# Patient Record
Sex: FEMALE | Race: WHITE | NOT HISPANIC OR LATINO | URBAN - METROPOLITAN AREA
[De-identification: names, ages, dates, MRNs, and addresses within clinical notes are randomized per-mention and may not be internally consistent; named-entity substitution may affect disease eponyms.]

---

## 2017-07-26 ENCOUNTER — APPOINTMENT (OUTPATIENT)
Age: 32
Setting detail: DERMATOLOGY
End: 2017-07-26

## 2017-07-26 DIAGNOSIS — D18.0 HEMANGIOMA: ICD-10-CM

## 2017-07-26 DIAGNOSIS — L81.4 OTHER MELANIN HYPERPIGMENTATION: ICD-10-CM

## 2017-07-26 DIAGNOSIS — D485 NEOPLASM OF UNCERTAIN BEHAVIOR OF SKIN: ICD-10-CM

## 2017-07-26 DIAGNOSIS — D22 MELANOCYTIC NEVI: ICD-10-CM

## 2017-07-26 PROBLEM — D22.4 MELANOCYTIC NEVI OF SCALP AND NECK: Status: ACTIVE | Noted: 2017-07-26

## 2017-07-26 PROBLEM — D48.5 NEOPLASM OF UNCERTAIN BEHAVIOR OF SKIN: Status: ACTIVE | Noted: 2017-07-26

## 2017-07-26 PROBLEM — D18.01 HEMANGIOMA OF SKIN AND SUBCUTANEOUS TISSUE: Status: ACTIVE | Noted: 2017-07-26

## 2017-07-26 PROBLEM — D22.72 MELANOCYTIC NEVI OF LEFT LOWER LIMB, INCLUDING HIP: Status: ACTIVE | Noted: 2017-07-26

## 2017-07-26 PROBLEM — D22.71 MELANOCYTIC NEVI OF RIGHT LOWER LIMB, INCLUDING HIP: Status: ACTIVE | Noted: 2017-07-26

## 2017-07-26 PROBLEM — D22.5 MELANOCYTIC NEVI OF TRUNK: Status: ACTIVE | Noted: 2017-07-26

## 2017-07-26 PROBLEM — D22.39 MELANOCYTIC NEVI OF OTHER PARTS OF FACE: Status: ACTIVE | Noted: 2017-07-26

## 2017-07-26 PROCEDURE — 11101: CPT

## 2017-07-26 PROCEDURE — OTHER COUNSELING: OTHER

## 2017-07-26 PROCEDURE — OTHER BIOPSY BY SHAVE METHOD: OTHER

## 2017-07-26 PROCEDURE — 11100: CPT

## 2017-07-26 PROCEDURE — 99214 OFFICE O/P EST MOD 30 MIN: CPT | Mod: 25

## 2017-07-26 ASSESSMENT — LOCATION DETAILED DESCRIPTION DERM
LOCATION DETAILED: LEFT PROXIMAL PRETIBIAL REGION
LOCATION DETAILED: LEFT INFERIOR CENTRAL MALAR CHEEK
LOCATION DETAILED: LEFT SUPERIOR LATERAL MIDBACK
LOCATION DETAILED: RIGHT SUPERIOR MEDIAL UPPER BACK
LOCATION DETAILED: RIGHT MEDIAL UPPER BACK
LOCATION DETAILED: LEFT ANTERIOR PROXIMAL THIGH
LOCATION DETAILED: INFERIOR THORACIC SPINE
LOCATION DETAILED: RIGHT SUPERIOR ANTERIOR NECK
LOCATION DETAILED: SUPERIOR THORACIC SPINE
LOCATION DETAILED: RIGHT ANTERIOR PROXIMAL THIGH
LOCATION DETAILED: LEFT DORSAL FOOT
LOCATION DETAILED: LEFT SUPERIOR MEDIAL MIDBACK
LOCATION DETAILED: RIGHT PROXIMAL PRETIBIAL REGION
LOCATION DETAILED: RIGHT PROXIMAL DORSAL FOREARM
LOCATION DETAILED: LEFT PROXIMAL DORSAL FOREARM
LOCATION DETAILED: LEFT LATERAL ABDOMEN

## 2017-07-26 ASSESSMENT — LOCATION SIMPLE DESCRIPTION DERM
LOCATION SIMPLE: RIGHT UPPER BACK
LOCATION SIMPLE: RIGHT THIGH
LOCATION SIMPLE: LEFT PRETIBIAL REGION
LOCATION SIMPLE: LEFT FOREARM
LOCATION SIMPLE: LEFT THIGH
LOCATION SIMPLE: LEFT LOWER BACK
LOCATION SIMPLE: RIGHT ANTERIOR NECK
LOCATION SIMPLE: ABDOMEN
LOCATION SIMPLE: RIGHT FOREARM
LOCATION SIMPLE: LEFT CHEEK
LOCATION SIMPLE: LEFT FOOT
LOCATION SIMPLE: UPPER BACK
LOCATION SIMPLE: RIGHT PRETIBIAL REGION

## 2017-07-26 ASSESSMENT — LOCATION ZONE DERM
LOCATION ZONE: FEET
LOCATION ZONE: ARM
LOCATION ZONE: FACE
LOCATION ZONE: NECK
LOCATION ZONE: TRUNK
LOCATION ZONE: LEG

## 2017-07-26 NOTE — PROCEDURE: BIOPSY BY SHAVE METHOD
Size Of Lesion In Cm: 0.3
Destruction After The Procedure: No
Dressing: bandage
Biopsy Method: Dermablade
Type Of Destruction Used: Curettage
Detail Level: Detailed
Curettage Text: The wound bed was treated with curettage after the biopsy was performed.
Anesthesia Volume In Cc (Will Not Render If 0): 0.5
Biopsy Type: H and E
Billing Type: Third-Party Bill
Electrodesiccation Text: The wound bed was treated with electrodesiccation after the biopsy was performed.
Silver Nitrate Text: The wound bed was treated with silver nitrate after the biopsy was performed.
Notification Instructions: Patient will be notified of biopsy results. However, patient instructed to call the office if not contacted within 2 weeks.
Additional Anesthesia Volume In Cc (Will Not Render If 0): 0
Render Post-Care Instructions In Note?: yes
Wound Care: Vaseline
Consent: Written consent was obtained and risks were reviewed including but not limited to scarring, infection, bleeding, scabbing, incomplete removal, nerve damage and allergy to anesthesia.
Cryotherapy Text: The wound bed was treated with cryotherapy after the biopsy was performed.
Post-Care Instructions: I reviewed with the patient in detail post-care instructions. Patient is to keep the biopsy site dry overnight, and then apply bacitracin twice daily until healed. Patient may apply hydrogen peroxide soaks to remove any crusting.
Hemostasis: Drysol
Anesthesia Type: 2% lidocaine with epinephrine
Electrodesiccation And Curettage Text: The wound bed was treated with electrodesiccation and curettage after the biopsy was performed.

## 2017-08-29 ENCOUNTER — APPOINTMENT (OUTPATIENT)
Age: 32
Setting detail: DERMATOLOGY
End: 2017-08-29

## 2017-08-29 DIAGNOSIS — D22 MELANOCYTIC NEVI: ICD-10-CM

## 2017-08-29 PROBLEM — D22.5 MELANOCYTIC NEVI OF TRUNK: Status: ACTIVE | Noted: 2017-08-29

## 2017-08-29 PROCEDURE — 11400 EXC TR-EXT B9+MARG 0.5 CM<: CPT

## 2017-08-29 PROCEDURE — 11400 EXC TR-EXT B9+MARG 0.5 CM<: CPT | Mod: 76

## 2017-08-29 PROCEDURE — OTHER PUNCH EXCISION: OTHER

## 2017-08-29 ASSESSMENT — LOCATION SIMPLE DESCRIPTION DERM
LOCATION SIMPLE: UPPER BACK
LOCATION SIMPLE: LEFT LOWER BACK

## 2017-08-29 ASSESSMENT — LOCATION ZONE DERM: LOCATION ZONE: TRUNK

## 2017-08-29 ASSESSMENT — LOCATION DETAILED DESCRIPTION DERM
LOCATION DETAILED: LEFT INFERIOR MEDIAL MIDBACK
LOCATION DETAILED: INFERIOR THORACIC SPINE

## 2017-08-29 NOTE — PROCEDURE: PUNCH EXCISION
Path Notes (To The Dermatopathologist): Please check margins. Previous Accession [id number] Path Notes (To The Dermatopathologist): Please check margins. Previous Accession [id number]

## 2018-02-14 ENCOUNTER — APPOINTMENT (OUTPATIENT)
Age: 33
Setting detail: DERMATOLOGY
End: 2018-02-15

## 2018-02-14 DIAGNOSIS — Z87.2 PERSONAL HISTORY OF DISEASES OF THE SKIN AND SUBCUTANEOUS TISSUE: ICD-10-CM

## 2018-02-14 DIAGNOSIS — L57.8 OTHER SKIN CHANGES DUE TO CHRONIC EXPOSURE TO NONIONIZING RADIATION: ICD-10-CM

## 2018-02-14 DIAGNOSIS — Z71.89 OTHER SPECIFIED COUNSELING: ICD-10-CM

## 2018-02-14 DIAGNOSIS — D22 MELANOCYTIC NEVI: ICD-10-CM

## 2018-02-14 DIAGNOSIS — L81.4 OTHER MELANIN HYPERPIGMENTATION: ICD-10-CM

## 2018-02-14 DIAGNOSIS — L91.0 HYPERTROPHIC SCAR: ICD-10-CM

## 2018-02-14 PROBLEM — D22.5 MELANOCYTIC NEVI OF TRUNK: Status: ACTIVE | Noted: 2018-02-14

## 2018-02-14 PROBLEM — D22.62 MELANOCYTIC NEVI OF LEFT UPPER LIMB, INCLUDING SHOULDER: Status: ACTIVE | Noted: 2018-02-14

## 2018-02-14 PROBLEM — D22.61 MELANOCYTIC NEVI OF RIGHT UPPER LIMB, INCLUDING SHOULDER: Status: ACTIVE | Noted: 2018-02-14

## 2018-02-14 PROCEDURE — 11900 INJECT SKIN LESIONS </W 7: CPT

## 2018-02-14 PROCEDURE — OTHER INTRALESIONAL KENALOG: OTHER

## 2018-02-14 PROCEDURE — 99214 OFFICE O/P EST MOD 30 MIN: CPT | Mod: 25

## 2018-02-14 PROCEDURE — OTHER COUNSELING: OTHER

## 2018-02-14 PROCEDURE — OTHER MIPS QUALITY: OTHER

## 2018-02-14 ASSESSMENT — LOCATION DETAILED DESCRIPTION DERM
LOCATION DETAILED: INFERIOR LUMBAR SPINE
LOCATION DETAILED: LEFT ANTERIOR SHOULDER
LOCATION DETAILED: LEFT SUPERIOR MEDIAL UPPER BACK
LOCATION DETAILED: RIGHT ANTERIOR SHOULDER
LOCATION DETAILED: RIGHT PROXIMAL DORSAL FOREARM
LOCATION DETAILED: RIGHT INFERIOR MEDIAL UPPER BACK
LOCATION DETAILED: LEFT DISTAL DORSAL FOREARM
LOCATION DETAILED: EPIGASTRIC SKIN
LOCATION DETAILED: LEFT SUPERIOR MEDIAL LOWER BACK
LOCATION DETAILED: RIGHT INFERIOR MEDIAL FOREHEAD

## 2018-02-14 ASSESSMENT — LOCATION SIMPLE DESCRIPTION DERM
LOCATION SIMPLE: LEFT FOREARM
LOCATION SIMPLE: LEFT SHOULDER
LOCATION SIMPLE: RIGHT FOREARM
LOCATION SIMPLE: RIGHT FOREHEAD
LOCATION SIMPLE: RIGHT UPPER BACK
LOCATION SIMPLE: LEFT LOWER BACK
LOCATION SIMPLE: LEFT UPPER BACK
LOCATION SIMPLE: RIGHT SHOULDER
LOCATION SIMPLE: ABDOMEN
LOCATION SIMPLE: LOWER BACK

## 2018-02-14 ASSESSMENT — LOCATION ZONE DERM
LOCATION ZONE: FACE
LOCATION ZONE: TRUNK
LOCATION ZONE: ARM

## 2018-02-14 NOTE — PROCEDURE: INTRALESIONAL KENALOG
Kenalog Preparation: Kenalog
Total Volume Injected (Ccs- Only Use Numbers And Decimals): 0.2
Consent: The risks of atrophy were reviewed with the patient.
Treatment Number (Optional): 1
X Size Of Lesion In Cm (Optional): 0
Detail Level: Simple
Concentration Of Solution Injected (Mg/Ml): 10.0
Include Z78.9 (Other Specified Conditions Influencing Health Status) As An Associated Diagnosis?: Yes
Administered By (Optional): Ankita Shanks, NP-C
Medical Necessity Clause: This procedure was medically necessary because the lesions that were treated were:

## 2019-11-12 ENCOUNTER — ANCILLARY PROCEDURE (OUTPATIENT)
Dept: RADIOLOGY | Facility: URGENT CARE | Age: 34
End: 2019-11-12
Payer: OTHER GOVERNMENT

## 2019-11-12 ENCOUNTER — OFFICE VISIT (OUTPATIENT)
Dept: URGENT CARE | Facility: URGENT CARE | Age: 34
End: 2019-11-12
Payer: OTHER GOVERNMENT

## 2019-11-12 VITALS
HEART RATE: 81 BPM | SYSTOLIC BLOOD PRESSURE: 109 MMHG | HEIGHT: 65 IN | WEIGHT: 120 LBS | OXYGEN SATURATION: 98 % | TEMPERATURE: 97.5 F | BODY MASS INDEX: 19.99 KG/M2 | RESPIRATION RATE: 18 BRPM | DIASTOLIC BLOOD PRESSURE: 58 MMHG

## 2019-11-12 DIAGNOSIS — M79.675 GREAT TOE PAIN, LEFT: Primary | ICD-10-CM

## 2019-11-12 DIAGNOSIS — S92.425A CLOSED NONDISPLACED FRACTURE OF DISTAL PHALANX OF LEFT GREAT TOE, INITIAL ENCOUNTER: ICD-10-CM

## 2019-11-12 PROCEDURE — 99202 OFFICE O/P NEW SF 15 MIN: CPT | Performed by: PHYSICIAN ASSISTANT

## 2019-11-12 PROCEDURE — 73660 X-RAY EXAM OF TOE(S): CPT | Mod: TC,LT | Performed by: PHYSICIAN ASSISTANT

## 2019-11-12 ASSESSMENT — PAIN SCALES - GENERAL: PAINLEVEL: 2

## 2019-11-12 NOTE — PROGRESS NOTES
"Subjective      HPI    Dodie Stark is a 34 y.o. female who presents for toe pain.  Patient states last night she was walking up steps from her basement and rammed her toe into the step.  She did have feel a pop initially but did not have a great amount of pain throughout the day today the pain is worse since she has noticed increased swelling and bruising.  She has used ice and Motrin with minimal relief.    Review of Systems    As noted in HPI.      Objective   /58 (BP Location: Right arm, Patient Position: Sitting, Cuff Size: Reg)   Pulse 81   Temp 36.4 °C (97.5 °F) (Temporal)   Resp 18   Ht 1.651 m (5' 5\")   Wt 54.4 kg (120 lb)   SpO2 98%   BMI 19.97 kg/m²     Physical Exam  Vitals signs and nursing note reviewed.   Constitutional:       Appearance: Normal appearance. She is normal weight.   HENT:      Head: Normocephalic and atraumatic.   Cardiovascular:      Pulses: Normal pulses.   Pulmonary:      Effort: Pulmonary effort is normal.   Musculoskeletal:      Comments: Patient has ecchymosis and tenderness to palpation over the medial aspect of the DIP joint on the left great toe.  She does have full range of motion of the digit although she does have tenderness with hyperextension.  Neurovascular is intact.   Neurological:      Mental Status: She is alert.         No results found for this or any previous visit (from the past 4 hour(s)).    X-ray Toe 2 Or More Views Left    Result Date: 11/12/2019  Exam: Left toe series on 11/12/2019 at 1637 hours Clinical History: Great toe pain left; tenderness lateral aspect of the first DIP. Procedure/Views: 3 views Comparison/s: There are no previous comparable exams available. Findings: Bones demonstrate normal morphology and density. Nondisplaced fractures base of the distal phalanx of the great toe. The anterior phalangeal joint demonstrates anatomic alignment. Proximal phalanx, first MTP joint and first metatarsal appear intact.     IMPRESSION: Nondisplaced " fracture of the base of the distal phalanx of the great toe.        Assessment/Plan   Diagnoses and all orders for this visit:    Great toe pain, left  -     X-ray toe 2 or more views left    Closed nondisplaced fracture of distal phalanx of left great toe, initial encounter        Discussion:   Discussed Fracture with Patient.  Did Recommend a Minimum Eder Taping but Did Also Recommend Postop Shoe or Short Walking Boot Patient Did Refuse Those.  We Discussed Hard Soled Shoes Not Allowing the Toe to Flex No Flip-Flops No High Heels Etc.  Patient Understands and Agrees to Plan of Care.       ESTEFANIA Sabillon

## 2020-03-13 ENCOUNTER — APPOINTMENT (OUTPATIENT)
Age: 35
Setting detail: DERMATOLOGY
End: 2020-03-17

## 2020-03-13 DIAGNOSIS — Z41.9 ENCOUNTER FOR PROCEDURE FOR PURPOSES OTHER THAN REMEDYING HEALTH STATE, UNSPECIFIED: ICD-10-CM

## 2020-03-13 PROCEDURE — OTHER PULSED-DYE LASER: OTHER

## 2020-03-13 PROCEDURE — OTHER OTHER (COSMETIC): OTHER

## 2020-03-13 ASSESSMENT — LOCATION SIMPLE DESCRIPTION DERM: LOCATION SIMPLE: LEFT CHEEK

## 2020-03-13 ASSESSMENT — LOCATION ZONE DERM: LOCATION ZONE: FACE

## 2020-03-13 ASSESSMENT — LOCATION DETAILED DESCRIPTION DERM: LOCATION DETAILED: LEFT INFERIOR CENTRAL MALAR CHEEK

## 2020-03-13 NOTE — PROCEDURE: OTHER (COSMETIC)
Detail Level: Zone
Other (Free Text): Patient tolerated treatment. \\nWe reviewed post care, staying out of direct sunlight, sunscreen and wearing clothing to cover treated area when outside for extended periods. \\nApplied Spf 50 and given cold compress. \\nFollow up PRN. bbw

## 2020-03-13 NOTE — PROCEDURE: PULSED-DYE LASER
Pulse Duration: 10 ms
Spot Size: 5 mm
Delay Time (Ms): 20
Fluence In J/Cm2 (Optional): 10
Pulse Count (Optional): 4
Cryogen Time (Ms): 30
Price (Use Numbers Only, No Special Characters Or $): 100.00
Immediate Endpoint: erythema
Post-Care Instructions: I reviewed with the patient in detail post-care instructions. Patient should stay away from the sun and wear sun protection until treated areas are fully healed.
Spot Size: 7 mm pigmented lesion handpiece
Post-Procedure Care: Vaseline and ice applied. Post care reviewed with patient.
Spot Size: 12 mm
Detail Level: Zone
Laser Type: Vbeam 595nm
Pulse Duration: 20 ms
Treated Area: small area
Location Override: Cheek
Consent: Written consent obtained, risks reviewed including but not limited to crusting, scabbing, blistering, scarring, darker or lighter pigmentary change, incidental hair removal, bruising, and/or incomplete removal.
Pulse Duration: 1.5 ms
Cryogen Time (Ms): 40
Fluence In J/Cm2 (Optional): 9.75

## 2021-04-01 DIAGNOSIS — Z23 HIGH PRIORITY FOR 2019-NCOV VACCINE: ICD-10-CM

## 2021-10-29 ENCOUNTER — CLINICAL SUPPORT (OUTPATIENT)
Dept: FAMILY MEDICINE | Facility: CLINIC | Age: 36
End: 2021-10-29
Payer: OTHER GOVERNMENT

## 2021-10-29 DIAGNOSIS — Z23 ENCOUNTER FOR VACCINATION: Primary | ICD-10-CM

## 2021-10-29 PROCEDURE — 91300 PFIZER-BIONTECH SARS-COV-2 VACCINE: CPT | Performed by: FAMILY MEDICINE

## 2021-10-29 PROCEDURE — 0003A PFIZER-BIONTECH SARS-COV-2 VACCINE: CPT | Performed by: FAMILY MEDICINE

## 2021-12-28 ENCOUNTER — ANCILLARY ORDERS (OUTPATIENT)
Dept: RADIOLOGY | Facility: URGENT CARE | Age: 36
End: 2021-12-28
Payer: OTHER GOVERNMENT

## 2021-12-28 ENCOUNTER — ANCILLARY PROCEDURE (OUTPATIENT)
Dept: RADIOLOGY | Facility: URGENT CARE | Age: 36
End: 2021-12-28
Payer: OTHER GOVERNMENT

## 2021-12-28 DIAGNOSIS — M79.672 LEFT FOOT PAIN: ICD-10-CM

## 2021-12-28 PROCEDURE — 73630 X-RAY EXAM OF FOOT: CPT | Mod: TC,LT | Performed by: FAMILY MEDICINE

## 2022-01-16 ENCOUNTER — APPOINTMENT (OUTPATIENT)
Dept: LAB | Facility: URGENT CARE | Age: 37
End: 2022-01-16
Payer: OTHER GOVERNMENT

## 2022-01-16 DIAGNOSIS — Z20.822 CONTACT WITH AND (SUSPECTED) EXPOSURE TO COVID-19: ICD-10-CM

## 2022-01-16 PROCEDURE — 87635 SARS-COV-2 COVID-19 AMP PRB: CPT | Performed by: FAMILY MEDICINE

## 2022-01-16 PROCEDURE — U0005 INFEC AGEN DETEC AMPLI PROBE: HCPCS | Performed by: FAMILY MEDICINE

## 2022-01-16 PROCEDURE — 99211 OFF/OP EST MAY X REQ PHY/QHP: CPT | Mod: LAB | Performed by: FAMILY MEDICINE

## 2022-01-17 LAB — SARS-COV-2 RNA RESP QL NAA+PROBE: NEGATIVE

## 2022-05-11 ENCOUNTER — TELEPHONE (OUTPATIENT)
Dept: NEUROLOGY | Facility: CLINIC | Age: 37
End: 2022-05-11
Payer: OTHER GOVERNMENT

## 2022-05-11 NOTE — TELEPHONE ENCOUNTER
5/11/22-LVM on cell to set up emg 2nd attempt, sent letter.AM  5/3/22-LVM on cell returning patients call to set up emg with 1st.AM

## 2022-10-20 ENCOUNTER — ANCILLARY PROCEDURE (OUTPATIENT)
Dept: RADIOLOGY | Facility: URGENT CARE | Age: 37
End: 2022-10-20
Payer: OTHER GOVERNMENT

## 2022-10-20 ENCOUNTER — OFFICE VISIT (OUTPATIENT)
Dept: URGENT CARE | Facility: URGENT CARE | Age: 37
End: 2022-10-20
Payer: OTHER GOVERNMENT

## 2022-10-20 VITALS
DIASTOLIC BLOOD PRESSURE: 65 MMHG | SYSTOLIC BLOOD PRESSURE: 116 MMHG | TEMPERATURE: 97.9 F | HEART RATE: 85 BPM | WEIGHT: 120 LBS | RESPIRATION RATE: 20 BRPM | HEIGHT: 65 IN | OXYGEN SATURATION: 100 % | BODY MASS INDEX: 19.99 KG/M2

## 2022-10-20 DIAGNOSIS — R00.2 PALPITATIONS WITH REGULAR CARDIAC RHYTHM: Primary | ICD-10-CM

## 2022-10-20 PROBLEM — J30.1 ALLERGIC RHINITIS DUE TO POLLEN: Status: ACTIVE | Noted: 2020-10-09

## 2022-10-20 PROBLEM — Z51.6 DESENSITIZATION TO ALLERGENS: Status: ACTIVE | Noted: 2020-10-13

## 2022-10-20 PROBLEM — N91.5 OLIGOMENORRHEA: Status: ACTIVE | Noted: 2022-10-20

## 2022-10-20 LAB
ALBUMIN SERPL-MCNC: 4.7 G/DL (ref 3.3–5.5)
ALP SERPL-CCNC: 55 U/L (ref 42–141)
ALT SERPL-CCNC: 22 U/L (ref 10–47)
ANION GAP SERPL CALC-SCNC: 12 MMOL/L (ref 3–11)
AST SERPL-CCNC: 26 U/L
BASOPHILS # BLD AUTO: 0.03 10*3/UL
BASOPHILS NFR BLD AUTO: 0.3 % (ref 0–2)
BILIRUB SERPL-MCNC: 0.6 MG/DL (ref 0.2–1.4)
BUN SERPL-MCNC: 15 MG/DL (ref 7–25)
CALCIUM ALBUM COR SERPL-MCNC: 9.8 MG/DL (ref 8.6–10.3)
CALCIUM SERPL-MCNC: 10.4 MG/DL (ref 8.6–10.3)
CHLORIDE SERPL-SCNC: 106 MMOL/L (ref 98–107)
CO2 SERPL-SCNC: 25 MMOL/L (ref 21–32)
CREAT SERPL-MCNC: 0.8 MG/DL (ref 0.6–1.1)
EOSINOPHIL # BLD AUTO: 0.14 10*3/UL
EOSINOPHIL NFR BLD AUTO: 1.6 % (ref 0–3)
ERYTHROCYTE [DISTWIDTH] IN BLOOD BY AUTOMATED COUNT: 12.6 % (ref 11.5–14)
FERRITIN SERPL-MCNC: 60.3 NG/ML (ref 11–307)
GFR SERPL CREATININE-BSD FRML MDRD: 98 ML/MIN/1.73M*2
GLUCOSE SERPL-MCNC: 97 MG/DL (ref 70–105)
HCT VFR BLD AUTO: 45.1 % (ref 34–45)
HGB BLD-MCNC: 15.2 G/DL (ref 11.5–15.5)
IRON SATN MFR SERPL: 37 % (ref 13–50)
IRON SERPL-MCNC: 115 UG/DL (ref 50–175)
LYMPHOCYTES # BLD AUTO: 1.2 10*3/UL
LYMPHOCYTES NFR BLD AUTO: 13.4 % (ref 11–47)
MAGNESIUM SERPL-MCNC: 1.9 MG/DL (ref 1.8–2.4)
MCH RBC QN AUTO: 32.2 PG (ref 28–33)
MCHC RBC AUTO-ENTMCNC: 33.7 G/DL (ref 32–36)
MCV RBC AUTO: 95.7 FL (ref 81–97)
MONOCYTES # BLD AUTO: 0.5 10*3/UL
MONOCYTES NFR BLD AUTO: 5.6 % (ref 3–11)
NEUTROPHILS # BLD AUTO: 7.13 10*3/UL
NEUTROPHILS NFR BLD AUTO: 79.2 % (ref 41–81)
PLATELET # BLD AUTO: 232 10*3/UL (ref 140–350)
PMV BLD AUTO: 9.4 FL (ref 6.9–10.8)
POTASSIUM SERPL-SCNC: 4 MMOL/L (ref 3.5–5.1)
PROT SERPL-MCNC: 8.2 G/DL (ref 6.4–8.1)
RBC # BLD AUTO: 4.71 10*6/ΜL (ref 3.7–5.3)
SODIUM SERPL-SCNC: 143 MMOL/L (ref 128–145)
TIBC SERPL-MCNC: 312 UG/DL (ref 250–450)
UIBC SERPL-MCNC: 197 UG/DL (ref 155–355)
WBC # BLD AUTO: 9 10*3/UL (ref 4.5–10.5)

## 2022-10-20 PROCEDURE — 99214 OFFICE O/P EST MOD 30 MIN: CPT | Performed by: PHYSICIAN ASSISTANT

## 2022-10-20 PROCEDURE — 36415 COLL VENOUS BLD VENIPUNCTURE: CPT | Performed by: PHYSICIAN ASSISTANT

## 2022-10-20 PROCEDURE — 93010 ELECTROCARDIOGRAM REPORT: CPT | Performed by: INTERNAL MEDICINE

## 2022-10-20 PROCEDURE — 93005 ELECTROCARDIOGRAM TRACING: CPT | Performed by: PHYSICIAN ASSISTANT

## 2022-10-20 PROCEDURE — 85025 COMPLETE CBC W/AUTO DIFF WBC: CPT | Performed by: PHYSICIAN ASSISTANT

## 2022-10-20 PROCEDURE — 83550 IRON BINDING TEST: CPT | Performed by: PHYSICIAN ASSISTANT

## 2022-10-20 PROCEDURE — 71046 X-RAY EXAM CHEST 2 VIEWS: CPT | Mod: TC | Performed by: PHYSICIAN ASSISTANT

## 2022-10-20 PROCEDURE — 83540 ASSAY OF IRON: CPT | Performed by: PHYSICIAN ASSISTANT

## 2022-10-20 PROCEDURE — 80053 COMPREHEN METABOLIC PANEL: CPT | Performed by: PHYSICIAN ASSISTANT

## 2022-10-20 PROCEDURE — 82728 ASSAY OF FERRITIN: CPT | Performed by: PHYSICIAN ASSISTANT

## 2022-10-20 PROCEDURE — 83735 ASSAY OF MAGNESIUM: CPT | Performed by: PHYSICIAN ASSISTANT

## 2022-10-20 RX ORDER — HYDROXYZINE PAMOATE 25 MG/1
25 CAPSULE ORAL 3 TIMES DAILY PRN
Qty: 30 CAPSULE | Refills: 0 | Status: SHIPPED | OUTPATIENT
Start: 2022-10-20 | End: 2022-10-30

## 2022-10-20 RX ORDER — ADAPALENE 1 MG/G
GEL TOPICAL EVERY 24 HOURS
COMMUNITY
Start: 2021-12-09

## 2022-10-20 RX ORDER — CLINDAMYCIN PHOSPHATE 11.9 MG/ML
SOLUTION TOPICAL
COMMUNITY
Start: 2021-12-09

## 2022-10-20 NOTE — PROGRESS NOTES
Subjective      Dodie Stark is a 37 y.o. female who presents for Chest Pain (Heart palpitations has been a couple day but the concern has been for 3 weeks.  Waiting on halter monitor through PCP.)  .      HPI    Patient presents to the Urgent Care clinic today with complaints of chest palpitations.       Patient presents today complaining of chest palpitations that has been ongoing on and off for about 4 weeks.  States that the pain starts at her lower left sternal border wraps underneath her left pec and then radiates up to the lateral left back.  She does report some tenderness to palpation at the site.  She reports no pain with deep inhalation or with movement.  Patient reports she followed up with the symptoms with her PCP Dr. Fleming at [facility].  At that time 3 weeks ago she was not given an ECG.  She was ordered a Holter monitor which she has not received yet.  At that time she also reports some anxiety and and was placed on magnesium supplement.  She is not on any birth control, has not traveled long distance recently or had any significant trauma with increased bruising.    Patient states that the last episode started yesterday after her 2 cats got into a fight which startled her.  States that this episode lasted about 4 5 hours.  She does report some residual left lateral chest wall pain      Patient reports no nicotine use.  She denies any precipitating upper respiratory symptoms    She does report history of anxiety but is not taking any medication for this.    Patient denies any family issues with thalassemias or abnormal red blood cells.    The following have been reviewed and updated as appropriate in this visit:                  Current Outpatient Medications   Medication Sig Dispense Refill    adapalene (DIFFERIN) 0.1 % gel daily      clindamycin (CLEOCIN T) 1 % external solution        No current facility-administered medications for this visit.         Review of Systems  See HPI    Objective  "    Vitals:  /65 (BP Location: Left arm, Patient Position: Sitting, Cuff Size: Regular Adult)   Pulse 85   Temp 36.6 °C (97.9 °F) (Temporal)   Resp 20   Ht 1.651 m (5' 5\")   Wt 54.4 kg (120 lb)   SpO2 100%   BMI 19.97 kg/m²       Physical Exam  Vitals and nursing note reviewed.   Constitutional:       Appearance: Normal appearance. She is normal weight.   Cardiovascular:      Rate and Rhythm: Normal rate and regular rhythm.      Pulses: Normal pulses.      Heart sounds: Normal heart sounds. No murmur heard.     Comments: 2 ectopic beats palpated over 2-minute duration  Pulmonary:      Effort: Pulmonary effort is normal. No respiratory distress.      Breath sounds: Normal breath sounds. No stridor.   Chest:      Chest wall: Tenderness present. No swelling or edema. There is no dullness to percussion.          Comments: Marked areas tender to palpation similar to chest pain experienced in the past  Musculoskeletal:      Cervical back: Normal range of motion and neck supple.      Right lower leg: No edema.      Left lower leg: No edema.   Skin:     General: Skin is warm and dry.   Neurological:      General: No focal deficit present.      Mental Status: She is alert and oriented to person, place, and time.         No results found for this or any previous visit (from the past 24 hour(s)).      Assessment/Plan     Diagnoses and all orders for this visit:    Palpitations with regular cardiac rhythm  -     ECG 12 lead - Normal, Today            MDM      Chest x-ray is unremarkable  Based on PERC criteria pulmonary embolism is ruled out  ECG no evidence of ST elevation or depression noted.  No prior ECG to compare  CBC shows mild increase in hematocrit  CMP shows anion gap at 12 and calcium at 10.4 otherwise all within normal limits  Low suspicion for any immediate cardiac concerns ACS effectively ruled out on exam and ECG today.  Vital signs all within normal limits  Reproducible chest pain along the left " lateral inferior chest wall.  Unable to reproduce chest pain with movement.  Lungs clear and equal bilaterally 2-minute palpation of radial pulses did reveal 2 ectopic beats at that time patient reported no symptoms.  No swelling or erythema noted in a lower extremities.  No suspicion for DVT with no risk factors noted.  Physical exam reassuring suspect patient's symptoms may be associate with anxiety      Discussion/Plan    We have a magnesium study as well as an iron study pending we will call you with those results.    Your ECG today is reassuring for any acute abnormalities.    The chest x-ray was also normal.    The returned lab work was also all reassuring.    In the meantime I do recommend starting hydroxyzine 3 tablets once a day for anxiety.  And monitor for improvement or changes in symptoms.    Strongly encourage you to contact cardiology to get the Holter monitor set up and follow-up with Dr. Fleming        No follow-ups on file.    The patient verbalizes agreement and understanding the plan    ESTEFANIA Lind       A voice recognition program was used to aid in documentation of this record. Sometimes words are not printed exactly as they were spoken.  While efforts were made to carefully edit and correct any inaccuracies, some areas may be present; please take these into context.  Please contact the provider if areas are identified.

## 2022-10-20 NOTE — PATIENT INSTRUCTIONS
We have a magnesium study as well as an iron study pending we will call you with those results.    Your ECG today is reassuring for any acute abnormalities.    The chest x-ray was also normal.    The returned lab work was also all reassuring.    In the meantime I do recommend starting hydroxyzine 3 tablets once a day for anxiety.  And monitor for improvement or changes in symptoms.    Strongly encourage you to contact cardiology to get the Holter monitor set up and follow-up with Dr. Fleming

## 2023-02-10 ENCOUNTER — ANCILLARY PROCEDURE (OUTPATIENT)
Dept: CARDIOLOGY | Facility: CLINIC | Age: 38
End: 2023-02-10
Payer: OTHER GOVERNMENT

## 2023-02-10 DIAGNOSIS — R00.2 PALPITATIONS: ICD-10-CM

## 2023-02-21 LAB
RH CV SUPRAVENT % TOTAL BEATS: 0.03 %
RH CV TOTAL BEATS: NORMAL BEATS
RH CV VENTRICULAR % TOTAL BEATS: 0 %
RH SUPRAVENTRICULAR BEATS: 63 BEATS
RH VENTRICULAR BEATS: 0 BEATS

## 2023-02-21 PROCEDURE — 93224 XTRNL ECG REC UP TO 48 HRS: CPT | Performed by: INTERNAL MEDICINE

## 2024-09-04 NOTE — PROCEDURE: PUNCH EXCISION
Detail Level: Zone Photo Preface (Leave Blank If You Do Not Want): Photographs were obtained today 4 Mm Punch Excision Text: A 4 mm punch biopsy was used to excise the lesion to the level of the subcutaneous fat.  Blunt dissection was used to free the lesion from the surrounding tissues and the lesion was removed.

## 2024-11-25 ENCOUNTER — APPOINTMENT (OUTPATIENT)
Dept: CT IMAGING | Facility: HOSPITAL | Age: 39
End: 2024-11-25
Payer: COMMERCIAL

## 2024-11-25 ENCOUNTER — HOSPITAL ENCOUNTER (EMERGENCY)
Facility: HOSPITAL | Age: 39
Discharge: HOME | End: 2024-11-25
Attending: EMERGENCY MEDICINE
Payer: COMMERCIAL

## 2024-11-25 VITALS
TEMPERATURE: 99.2 F | HEIGHT: 65 IN | BODY MASS INDEX: 21.38 KG/M2 | WEIGHT: 128.31 LBS | RESPIRATION RATE: 18 BRPM | SYSTOLIC BLOOD PRESSURE: 127 MMHG | DIASTOLIC BLOOD PRESSURE: 61 MMHG | OXYGEN SATURATION: 100 % | HEART RATE: 103 BPM

## 2024-11-25 DIAGNOSIS — R10.2 VAGINAL PAIN: Primary | ICD-10-CM

## 2024-11-25 LAB
ALBUMIN SERPL-MCNC: 4.3 G/DL (ref 3.5–5.3)
ALP SERPL-CCNC: 45 U/L (ref 37–98)
ALT SERPL-CCNC: 12 U/L (ref 7–52)
ANION GAP SERPL CALC-SCNC: 7 MMOL/L (ref 3–11)
AST SERPL-CCNC: 21 U/L
BACTERIA #/AREA URNS HPF: NORMAL /HPF
BACTERIAL VAGINOSIS: NEGATIVE
BASOPHILS # BLD AUTO: 0 10*3/UL
BASOPHILS NFR BLD AUTO: 0.2 % (ref 0–2)
BILIRUB SERPL-MCNC: 0.42 MG/DL (ref 0.2–1.4)
BILIRUB UR QL: NEGATIVE
BUN SERPL-MCNC: 16 MG/DL (ref 7–25)
C TRACH DNA SPEC QL NAA+PROBE: NEGATIVE
CALCIUM ALBUM COR SERPL-MCNC: 9.2 MG/DL (ref 8.6–10.3)
CALCIUM SERPL-MCNC: 9.4 MG/DL (ref 8.6–10.3)
CANDIDA GLABRATA / CANDIDA KRUSEI: NEGATIVE
CANDIDA GROUP: NEGATIVE
CHLORIDE SERPL-SCNC: 105 MMOL/L (ref 98–107)
CLARITY UR: CLEAR
CO2 SERPL-SCNC: 25 MMOL/L (ref 21–32)
COLOR UR: YELLOW
CREAT SERPL-MCNC: 0.62 MG/DL (ref 0.6–1.1)
EGFRCR SERPLBLD CKD-EPI 2021: 118 ML/MIN/1.73M*2
EOSINOPHIL # BLD AUTO: 0 10*3/UL
EOSINOPHIL NFR BLD AUTO: 0.3 % (ref 0–3)
ERYTHROCYTE [DISTWIDTH] IN BLOOD BY AUTOMATED COUNT: 13 % (ref 11.5–14)
GLUCOSE SERPL-MCNC: 132 MG/DL (ref 70–105)
GLUCOSE UR QL: NEGATIVE MG/DL
HCG UR QL: NEGATIVE
HCT VFR BLD AUTO: 41 % (ref 34–45)
HGB BLD-MCNC: 14 G/DL (ref 11.5–15.5)
HGB UR QL: ABNORMAL
KETONES UR-MCNC: 40 MG/DL
LEUKOCYTE ESTERASE UR QL STRIP: NEGATIVE
LYMPHOCYTES # BLD AUTO: 0.7 10*3/UL
LYMPHOCYTES NFR BLD AUTO: 9.1 % (ref 11–47)
MCH RBC QN AUTO: 32.2 PG (ref 28–33)
MCHC RBC AUTO-ENTMCNC: 34.2 G/DL (ref 32–36)
MCV RBC AUTO: 94.3 FL (ref 81–97)
MONOCYTES # BLD AUTO: 0.5 10*3/UL
MONOCYTES NFR BLD AUTO: 6.5 % (ref 3–11)
N GONORRHOEA DNA SPEC QL NAA+PROBE: NEGATIVE
NEUTROPHILS # BLD AUTO: 6.5 10*3/UL
NEUTROPHILS NFR BLD AUTO: 83.9 % (ref 41–81)
NITRITE UR QL: NEGATIVE
PH UR: 5.5 PH
PLATELET # BLD AUTO: 218 10*3/UL (ref 140–350)
PMV BLD AUTO: 9.7 FL (ref 6.9–10.8)
POTASSIUM SERPL-SCNC: 3.7 MMOL/L (ref 3.5–5.1)
PROT SERPL-MCNC: 7.3 G/DL (ref 6–8.3)
PROT UR STRIP-MCNC: NEGATIVE MG/DL
RBC # BLD AUTO: 4.35 10*6/UL (ref 3.7–5.3)
RBC #/AREA URNS HPF: NORMAL /HPF
SODIUM SERPL-SCNC: 137 MMOL/L (ref 135–145)
SP GR UR: >=1.03 (ref 1–1.03)
SQUAMOUS #/AREA URNS HPF: NORMAL /HPF
T VAGINALIS DNA VAG QL PROBE+SIG AMP: NEGATIVE
UROBILINOGEN UR-MCNC: 0.2 MG/DL
WBC # BLD AUTO: 7.8 10*3/UL (ref 4.5–10.5)
WBC #/AREA URNS HPF: NORMAL /HPF
WBC CLUMPS #/AREA URNS HPF: NORMAL /HPF

## 2024-11-25 PROCEDURE — 36415 COLL VENOUS BLD VENIPUNCTURE: CPT | Performed by: EMERGENCY MEDICINE

## 2024-11-25 PROCEDURE — 81025 URINE PREGNANCY TEST: CPT | Performed by: NURSE PRACTITIONER

## 2024-11-25 PROCEDURE — 80053 COMPREHEN METABOLIC PANEL: CPT | Performed by: NURSE PRACTITIONER

## 2024-11-25 PROCEDURE — 87491 CHLMYD TRACH DNA AMP PROBE: CPT | Performed by: NURSE PRACTITIONER

## 2024-11-25 PROCEDURE — 99285 EMERGENCY DEPT VISIT HI MDM: CPT | Performed by: EMERGENCY MEDICINE

## 2024-11-25 PROCEDURE — 81001 URINALYSIS AUTO W/SCOPE: CPT | Performed by: EMERGENCY MEDICINE

## 2024-11-25 PROCEDURE — 2550000100 HC RX 255: Performed by: NURSE PRACTITIONER

## 2024-11-25 PROCEDURE — 72193 CT PELVIS W/DYE: CPT

## 2024-11-25 PROCEDURE — 87591 N.GONORRHOEAE DNA AMP PROB: CPT | Performed by: NURSE PRACTITIONER

## 2024-11-25 PROCEDURE — 0352U MULTIPLEX VAGINAL PANEL PCR: CPT | Performed by: NURSE PRACTITIONER

## 2024-11-25 PROCEDURE — 85025 COMPLETE CBC W/AUTO DIFF WBC: CPT | Performed by: NURSE PRACTITIONER

## 2024-11-25 RX ORDER — IOPAMIDOL 755 MG/ML
70 INJECTION, SOLUTION INTRAVASCULAR ONCE
Status: COMPLETED | OUTPATIENT
Start: 2024-11-25 | End: 2024-11-25

## 2024-11-25 RX ORDER — KETOROLAC TROMETHAMINE 15 MG/ML
15 INJECTION, SOLUTION INTRAMUSCULAR; INTRAVENOUS ONCE
Status: DISCONTINUED | OUTPATIENT
Start: 2024-11-25 | End: 2024-11-25 | Stop reason: HOSPADM

## 2024-11-25 RX ADMIN — IOPAMIDOL 70 ML: 755 INJECTION, SOLUTION INTRAVENOUS at 18:10

## 2024-11-25 NOTE — ED ATTESTATION NOTE
I personally saw and evaluated this patient. I discussed this management with the MARI, Vandana Allen CNP, & reviewed the MARI notes and agree with the documentation.  On my encounter, patient presents vaginal discomfort.  However does not have any vaginal discharge or other significant acute symptoms.  Pelvic exam performed by MARI.  Overall pelvic exam, abdominal examination, labs, imaging do not show any acute pathology.  Patient reporting pain that is localized to anterior portion of her vaginal wall.  However did not note any abnormalities on pelvic exam per MARI.  Overall does not have any right lower quadrant tenderness.  No cervical tenderness.  Overall no signs concerning for ovarian cyst, ovarian torsion, TOA, PID.  No further workup indicated at this time..  I personally made/improved to the management plan for this patient and take responsibility for the patient management.  Agree with nurse practitioner's presentation and plan for discharge. Further recommendations as per MARI's plan including disposition.    Procedures    A voice recognition program was used to aid in medical record documentation. Some words may be printed not exactly as they were spoken. Efforts were made to carefully edit and correct any inaccuracies; however, some errors may be present.      Anna Redman, Anna Lopez DO  11/28/24 [id number]

## 2024-11-26 NOTE — ED PROVIDER NOTES
ROOM:    [facility]:  Chief Complaint   Patient presents with    Pelvic Pain     Pt arrives to triage window on foot reporting pelvic pain and urinary frequency for several days.     HPI  Patient is a 39-year-old female who presents to the emergency department for evaluation of urinary frequency, urgency, dysuria, and vaginal pain.  Patient reports her symptoms initially began 1 week ago.  She reports that her symptoms have progressively worsened.  She reports she was feeling around in her vagina and felt a large mass to the anterior wall of her vagina.  She reports that this mass is very painful.  She denies any unusual vaginal discharge and is not concerned for sexually transmitted infection.  She denies any injury or trauma.  She is not having any fever or chills.  She denies any back or abdominal pain.  She denies any nausea or vomiting.  She does not believe she is pregnant.  Reviewed the past medical, family, and social history as documented by the nurse and agree.     HISTORY:  History reviewed. No pertinent past medical history.    Past Surgical History:   Procedure Laterality Date    NO PAST SURGERIES         History reviewed. No pertinent family history.    Social History     Tobacco Use    Smoking status: Never    Smokeless tobacco: Never       ROS:  Per [facility]    PHYSICAL EXAM:  ED Triage Vitals   Temp Heart Rate Resp BP SpO2   11/25/24 1539 11/25/24 1536 11/25/24 1539 11/25/24 1539 11/25/24 1536   37.3 °C (99.2 °F) 114 18 127/61 97 %      Mean BP (mmHg) Temp Source Heart Rate Source Patient Position BP Location   11/25/24 1539 11/25/24 1539 -- -- --   82 Oral         FiO2 (%)       --                Nursing note and vitals reviewed.  Constitutional: appears well-developed.   HENT: Moist mucous membranes.   Head: Normocephalic and atraumatic.   Eyes: Conjunctiva normal. Pupils are equal and round.  Neck: Supple.   Cardiovascular: Regular rate and rhythm. No murmurs, rubs, or gallops.  Pulmonary/Chest: No  respiratory distress.  Clear to auscultation bilaterally.  Abdominal: Soft and nontender.  Normal bowel sounds. No rebound or guarding.  Back: Non-tender.  Musculoskeletal: No edema  Neurological: Alert. No focal deficits.  Skin: Skin is warm and dry. No rash or lesions noted.  Psychiatric: Normal mood and affect.    Labs Reviewed   URINALYSIS, DIPSTICK ONLY - Abnormal       Result Value    Color, Urine Yellow      Clarity, Urine Clear      Specific Gravity, Urine >=1.030      Leukocytes, Urine Negative      Nitrite, Urine Negative      Protein, Urine Negative      Ketones, Urine 40 (*)     Urobilinogen, Urine 0.2      Bilirubin, Urine Negative      Blood, Urine Trace-intact (*)     Glucose, Urine Negative      pH, Urine 5.5     COMPREHENSIVE METABOLIC PANEL - Abnormal    Sodium 137      Potassium 3.7      Chloride 105      CO2 25      Anion Gap 7      BUN 16      Creatinine 0.62      Glucose 132 (*)     Calcium 9.4      AST 21      ALT (SGPT) 12      Alkaline Phosphatase 45      Total Protein 7.3      Albumin 4.3      Total Bilirubin 0.42      Corrected Calcium 9.2      eGFR 118      Narrative:     Calculation based on the 2021 Chronic Kidney Disease Epidemiology Collaboration (CKD-EPI) equation refit without adjustment for race.   CBC WITH AUTO DIFFERENTIAL - Abnormal    WBC 7.8      RBC 4.35      Hemoglobin 14.0      Hematocrit 41.0      MCV 94.3      MCH 32.2      MCHC 34.2      RDW 13.0      Platelets 218      MPV 9.7      Neutrophils% 83.9 (*)     Lymphocytes% 9.1 (*)     Monocytes% 6.5      Eosinophils% 0.3      Basophils% 0.2      ANC (auto diff) 6.50      Lymphocytes Absolute 0.70      Monocytes Absolute 0.50      Eosinophils Absolute 0.00      Basophils Absolute 0.00     MULTIPLEX VAGINAL PANEL PCR - Normal    Trichomonas vaginalis Negative      Bacterial Vaginosis Negative      Candida Group Negative      Candida Glabrata/ Destiney Krusei Negative      Narrative:     This test was performed using the FDA  approved Cepheid GeneXpert MVP PCR assay. The Xpert MVP PCR Assay performance has not been evaluated in patients less than 14 years of age.   N. GONORRHOEAE, RNA - Normal    N gonorrhoeae, RNA Negative      Narrative:     This test was performed at [facility] using the FDA approved Cepheid GeneXpert CT/NG PCR assay. The Xpert CT/NG PCR Assay performance has not been evaluated in patients less than 14 years of age.   C. TRACHOMATIS, RNA - Normal    Chlamydia, RNA Negative      Narrative:     This test was performed at [facility] using the FDA approved Cepheid GeneXpert CT/NG PCR assay. The Xpert CT/NG PCR Assay performance has not been evaluated in patients less than 14 years of age.   URINALYSIS, MICROSCOPIC ONLY - Normal    RBC, Urine 0-2      WBC, Urine 0-4      WBC Clumps, Urine None seen      Squamous Epithelial, Urine 0-4      Bacteria, Urine None seen      Narrative:     If patient has an indwelling urinary catheter, follow the Adult Indwelling Urinary Catheter Removal and Replacement Protocol to remove the catheter prior to obtaining the Urinalysis. If questions, please contact the primary provider for guidance.   URINALYSIS WITH MICROSCOPIC    Narrative:     The following orders were created for panel order Urinalysis w/microscopic Urine, Clean Catch.  Procedure                               Abnormality         Status                     ---------                               -----------         ------                     Urinalysis, microscopic ...[[id number]]  Normal              Final result               Urinalysis, dipstick Uri...[[id number]]  Abnormal            Final result                 Please view results for these tests on the individual orders.   HCG, RAPID QUALITATIVE, URINE    HCG qualitative, urine Negative      Narrative:     First morning sample is the recommended specimen; dilute urine samples may contain hCG levels below detectable  limits.    This test provides a presumptive diagnosis for pregnancy; quantitative serum BhCG is recommended if pregnancy is clinically suspected.       CT PELVIS W IV CONTRAST   Final Result   IMPRESSION:   1.  No definite vaginal or vulvar mass identified.                   ED Medication Administration from 11/25/2024 1536 to 11/25/2024 1930         Date/Time Order Dose Route Action Action by     11/25/2024 1730 [facility] ketorolac (TORADOL) injection 15 mg 15 mg intravenous Not Given Jane, ARTIS     11/25/2024 1810 [facility] iopamidoL (ISOVUE-370) 370 mg iodine /mL (76 %) injection 70 mL 70 mL intravenous Given CHET Strong            ED COURSE:  ED Course as of 11/25/24 1930 Mon Nov 25, 2024   1723 Pelvic pain performed.  Laboratory studies were collected. [NW]      ED Course User Index  [NW] Vandana Allen CNP     Sepsis Quality Bundle      MDM:     Patient is a 39-year-old female who presents to the emergency department for evaluation of urinary frequency, urgency, dysuria, and vaginal pain.  Old records reviewed, patient did have Holter monitor study in February 2023 which was unremarkable.  Nursing notes were reviewed as well as labs and imaging studies.  Imaging was independently reviewed as well as the radiology report as part of my evaluation.  Financial and social constraints, proximity to healthcare facilities, and comorbidities were considered in the treatment plan for this patient.    Patient had comprehensive evaluation including labs that did not show any significant leukocytosis or anemia.  There is no renal failure or electrolyte abnormalities.  Liver function test did not show cholecystitis or hepatitis.  Urinalysis does not show any overt signs of urinary tract infection.  hCG is negative and she is not pregnant or having pregnancy complications such as ectopic pregnancy    Patient consents to pelvic exam.  Pelvic exam is unremarkable, I did not visualize or palpate any large masses, fluctuance, or areas of  induration.  Laboratory studies were collected.  Vaginal panel did not show any trichomonas, BV, or Candida infection.  Gonorrhea and Chlamydia are negative.    CT pelvis does not show any definite vaginal or vulval mass identified.  She is offered Toradol in the emergency department however she declined.  On reevaluation, did discuss results with patient and her .  Uncertain etiology of patient's symptoms however her workup today has been reassuring.  She is instructed to follow closely with gynecologist, she reports that she did attempt to call a gynecologist but has not been established here and they are unable to see her until February.  She is provided with additional resources of gynecologist in [facility].  She is encouraged to follow-up with her primary care provider until she is able to be established with a gynecologist.  Symptomatic treatment discussed.  Patient verbalizes understanding she is to return to the emergency department with any new or worsening symptoms    Patient was seen in conjunction with Dr. Redman, supervising physician, examined patient and agrees with plan of care and disposition.        PROCEDURES:  Procedures        CLINICAL IMPRESSION:  Final diagnoses:   [R10.2] Vaginal pain   Urinary frequency  Urinary urgency  Dysuria          A voice recognition program was used to aid in documentation of this record.  Sometimes words are not printed exactly as they were spoken.  While efforts were made to carefully edit and correct any inaccuracies, some areas may be present; please take these into context.  Please contact the provider if areas are identified.         Vandana Allen, CNP  11/25/24 [id number]

## 2024-11-26 NOTE — DISCHARGE INSTRUCTIONS
Follow closely with your primary care provider until you are able to get into gynecology.    If you are unable to get an appointment at [place], you may consider [place] [facility].    Tylenol (acetaminophen) 650 mg every 6 hours as needed for pain or fever.  Ibuprofen 600 mg every 6 hours as needed for pain or fever.  Take food with ibuprofen to protect your stomach.    Do not insert anything into your vagina until you are symptom-free.    Return to the emergency department with any new or worsening symptoms